# Patient Record
Sex: FEMALE | Race: WHITE
[De-identification: names, ages, dates, MRNs, and addresses within clinical notes are randomized per-mention and may not be internally consistent; named-entity substitution may affect disease eponyms.]

---
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
## 2021-12-31 NOTE — EDM.PDOC
ED HPI GENERAL MEDICAL PROBLEM





- General


Chief Complaint: OB/GYN Problem


Stated Complaint: STARTED PERIOD WEEK EARLY AND CRAMPING BAD


Time Seen by Provider: 12/31/21 14:05


Source of Information: Reports: Patient, Old Records, RN


History Limitations: Reports: No Limitations





- History of Present Illness


INITIAL COMMENTS - FREE TEXT/NARRATIVE: 





22 yo female presents with heavy vaginal bleeding and cramping of recent onset. 

Was not due for her menses for another week. Is sexually active without 

protection. Took ibuprofen about 7 am today and Pamprin without relief. No 

dysuria. Has had ovarian cysts in the past. 


Onset: Today


Onset Date: 12/31/21


Duration: Hour(s):, Waxing/Waning


Location: Reports: Pelvis


Quality: Reports: Other (cramping)


Severity: Moderate


Improves with: Reports: None


Worsens with: Reports: None


Context: Reports: Other (heavy menses with abnormal timing)


Associated Symptoms: Reports: No Other Symptoms


Treatments PTA: Reports: Other (see below) (Pamprin)


  ** Pelvic


Pain Score (Numeric/FACES): 6





- Related Data


                                    Allergies











Allergy/AdvReac Type Severity Reaction Status Date / Time


 


No Known Allergies Allergy   Verified 12/31/21 13:56











Home Meds: 


                                    Home Meds





Albuterol Sulfate [Proventil Hfa] 2 puff IH Q4H PRN 02/03/19 [History]


Budesonide/Formoterol [Symbicort 80-4.5 MCG] 2 puff INH DAILY 12/31/21 [History]











Past Medical History





- Past Health History


Medical/Surgical History: Denies Medical/Surgical History


HEENT History: Reports: Impaired Vision


OB/GYN History: Reports: Other (See Below)


Other OB/GYN History: Hx of ovarian cysts


Musculoskeletal History: Reports: Fracture


Psychiatric History: Reports: Anxiety, Depression





- Infectious Disease History


Infectious Disease History: Reports: Novel Coronavirus





- Past Surgical History


HEENT Surgical History: Reports: Other (See Below)


Other HEENT Surgeries/Procedures: TONSIL STONES





Social & Family History





- Tobacco Use


Tobacco Use Status *Q: Never Tobacco User





- Caffeine Use


Caffeine Use: Reports: Tea





- Recreational Drug Use


Recreational Drug Use: No





- Living Situation & Occupation


Living situation: Reports: Single, with Family


Occupation: Student





ED ROS GENERAL





- Review of Systems


Review Of Systems: See Below


Constitutional: Reports: No Symptoms.  Denies: Fever, Chills


HEENT: Reports: No Symptoms


Respiratory: Reports: No Symptoms


Cardiovascular: Reports: No Symptoms


GI/Abdominal: Reports: No Symptoms


: Reports: Irregular Menses, Other (menorrhagia, cramps)


Musculoskeletal: Reports: No Symptoms


Skin: Reports: No Symptoms


Neurological: Reports: No Symptoms





ED EXAM, RENAL/





- Physical Exam


Exam: See Below


Exam Limited By: No Limitations


General Appearance: Alert, WD/WN, No Apparent Distress, Obese


Eye Exam: Bilateral Eye: Normal Inspection


Ears: Normal External Exam, Normal Canal, Hearing Grossly Normal, Normal TMs


Nose: Normal Inspection, No Blood


Throat/Mouth: Normal Inspection, Normal Lips, Normal Oropharynx, Normal Voice, 

No Airway Compromise


Head: Atraumatic, Normocephalic


Neck: Normal Inspection


Respiratory/Chest: No Respiratory Distress, Lungs Clear, Normal Breath Sounds, 

No Accessory Muscle Use


Cardiovascular: Regular Rate, Rhythm, No Edema


Back Exam: No: CVA Tenderness (R), CVA Tenderness (L)


Extremities: Normal Inspection


Neurological: Alert, Oriented, CN II-XII Intact, Normal Cognition, No 

Motor/Sensory Deficits


Psychiatric: Normal Affect, Normal Mood


Skin Exam: Warm, Dry, Intact, Normal Color, No Rash





Course





- Vital Signs


Last Recorded V/S: 


                                Last Vital Signs











Temp  36.4 C   12/31/21 13:49


 


Pulse  67   12/31/21 13:49


 


Resp  16   12/31/21 13:49


 


BP  127/67   12/31/21 13:49


 


Pulse Ox  100   12/31/21 13:49














- Orders/Labs/Meds


Labs: 


                                Laboratory Tests











  12/31/21 Range/Units





  13:58 


 


Urine HCG, Qual  Negative  











Meds: 


Medications














Discontinued Medications














Generic Name Dose Route Start Last Admin





  Trade Name Nica  PRN Reason Stop Dose Admin


 


Ketorolac Tromethamine  30 mg  12/31/21 14:15  12/31/21 14:22





  Ketorolac 30 Mg/Ml Sdv  IM  12/31/21 14:16  30 mg





  ONETIME ONE   Administration














Departure





- Departure


Time of Disposition: 14:50


Disposition: Home, Self-Care 01


Condition: Good


Clinical Impression: 


 Menstrual cramps








- Discharge Information


*PRESCRIPTION DRUG MONITORING PROGRAM REVIEWED*: Not Applicable


*COPY OF PRESCRIPTION DRUG MONITORING REPORT IN PATIENT RANJEET: Not Applicable


Referrals: 


Hailey Lopez PA-C [Primary Care Provider] - 


Forms:  ED Department Discharge


Additional Instructions: 


Try naproxen sodium 2 every 8 hrs with food as needed for cramping. May add 

acetaminophen up to 1000 mg every 6 hrs as needed for added relief. Recheck as 

needed. 





Sepsis Event Note (ED)





- Evaluation


Sepsis Screening Result: No Definite Risk





- Focused Exam


Vital Signs: 


                                   Vital Signs











  Temp Pulse Resp BP Pulse Ox


 


 12/31/21 13:49  36.4 C  67  16  127/67  100


 


 12/31/21 13:46  36.4 C  67  16  127/67  100